# Patient Record
Sex: FEMALE | Race: WHITE | Employment: FULL TIME | URBAN - METROPOLITAN AREA
[De-identification: names, ages, dates, MRNs, and addresses within clinical notes are randomized per-mention and may not be internally consistent; named-entity substitution may affect disease eponyms.]

---

## 2018-05-17 ENCOUNTER — HOSPITAL ENCOUNTER (OUTPATIENT)
Age: 63
Discharge: HOME | End: 2018-05-17
Attending: OTOLARYNGOLOGY
Payer: COMMERCIAL

## 2018-05-17 DIAGNOSIS — H90.3 SENSORINEURAL HEARING LOSS, BILATERAL: ICD-10-CM

## 2018-05-17 DIAGNOSIS — H91.90 UNSPECIFIED HEARING LOSS, UNSPECIFIED EAR: ICD-10-CM

## 2018-05-17 DIAGNOSIS — R42 DIZZINESS AND GIDDINESS: ICD-10-CM

## 2018-05-17 LAB — CREAT UR-MCNC: 0.8 MG/DL (ref 0.6–1.3)

## 2018-05-17 PROCEDURE — A9575 INJ GADOTERATE MEGLUMI 0.1ML: HCPCS | Performed by: OTOLARYNGOLOGY

## 2018-05-17 PROCEDURE — 82565 ASSAY OF CREATININE: CPT

## 2018-05-17 PROCEDURE — 74011636320 HC RX REV CODE- 636/320: Performed by: OTOLARYNGOLOGY

## 2018-05-17 PROCEDURE — 70553 MRI BRAIN STEM W/O & W/DYE: CPT

## 2018-05-17 RX ADMIN — GADOTERATE MEGLUMINE 20 ML: 376.9 INJECTION INTRAVENOUS at 17:00

## 2018-05-22 ENCOUNTER — HOSPITAL ENCOUNTER (OUTPATIENT)
Dept: PHYSICAL THERAPY | Age: 63
Discharge: HOME | End: 2018-05-22
Payer: COMMERCIAL

## 2018-05-22 PROCEDURE — 97110 THERAPEUTIC EXERCISES: CPT | Performed by: PHYSICAL THERAPIST

## 2018-05-22 PROCEDURE — 97162 PT EVAL MOD COMPLEX 30 MIN: CPT | Performed by: PHYSICAL THERAPIST

## 2018-05-22 NOTE — PROGRESS NOTES
PT DAILY TREATMENT NOTE     Patient Name: Howard Cote  Date:2018  : 1955  [x]  Patient  Verified  Payor: Ezra Price / Plan: [address] [place] [address] / Product Type: PPO /    In time:403  Out time:736  Total Treatment Time (min): 33  Visit #: 1     Treatment Area: Vertigo [R42]    SUBJECTIVE  Pain Level (0-10 scale): 1/10  Any medication changes, allergies to medications, adverse drug reactions, diagnosis change, or new procedure performed?: [x] No    [] Yes (see summary sheet for update)  Subjective functional status/changes:   [] No changes reported  HPI: Pt c/o increased dizziness that began 2018 when she woke up from a nap. Pt recalls having a \"horrible noise\" in her right ear and vertigo soon followed. Reports she was admitted to the hospital and stayed 3 days due to the dizziness. Reports she had an MRI that showed nothing significant. Reports she had an injection in the right inner ear on 18 w/o improvements noted. Reports she is on meclizine PRN. Dizziness increases w/ quick head movements and when she bends forward then looks up. Dizziness improves w/ closing her eyes and sitting still. Denies previous surgeries or injuries to her neck, back or shoulders. Denies previous episodes of dizziness or vertigo. Reports she has used a SPC on and off since dizziness began and that she is trying to get away from the [facility] at this time. Will hold onto her  if necessary, continues to drive and does not experience increased symptoms w/ driving. Reports she had her eye checked about 2 months ago and a new prescription which she feels is not correct but got sick and was unable to return to the eye doctor to date.      OBJECTIVE    Modality rationale:  to improve the patients ability to    Min Type Additional Details    [] Estim:  []Unatt       []IFC  []Premod                        []Other:  []w/ice   []w/heat  Position:  Location:    [] Estim: []Att    []TENS instruct  []NMES []Other:  []w/US   []w/ice   []w/heat  Position:  Location:    []  Traction: [] Cervical       []Lumbar                       [] Prone          []Supine                       []Intermittent   []Continuous Lbs:  [] before manual  [] after manual    []  Ultrasound: []Continuous   [] Pulsed                           []1MHz   []3MHz W/cm2:  Location:    []  Iontophoresis with dexamethasone         Location: [] Take home patch   [] In clinic    []  Ice     []  heat  []  Ice massage  []  Laser   []  Anodyne Position:  Location:    []  Laser with stim  []  Other:  Position:  Location:    []  Vasopneumatic Device Pressure:       [] lo [] med [] hi   Temperature: [] lo [] med [] hi   [] Skin assessment post-treatment:  []intact []redness- no adverse reaction    []redness  adverse reaction:     18 min [x]Eval                  []Re-Eval       15 min Therapeutic Exercise:  [] See flow sheet : instructed in and demo'd HEP, educated in mechanics of the inner ear and vestibular system   Rationale: improve coordination, improve balance and increase proprioception to improve the patients ability to improve activity tolerance and mobility      min Therapeutic Activity:  []  See flow sheet :   Rationale:   to improve the patients ability to       min Neuromuscular Re-education:  []  See flow sheet :   Rationale:   to improve the patients ability to      min Manual Therapy:     Rationale:  to      min Gait Training:  ___ feet with ___ device on level surfaces with ___ level of assist   Rationale: With   [] TE   [] TA   [] neuro   [] other: Patient Education: [x] Review HEP    [] Progressed/Changed HEP based on:   [] positioning   [] body mechanics   [] transfers   [] heat/ice application    [] other:      Other Objective/Functional Measures: Pt presents w/ staggering gait, no AD, and reaching for the walls to the left when she walks past them. Smooth pursuits w/ occasional nystagmus.  Occular AROM WNL w/o increased symptoms. VOR x1 (+) horizontally and vertically w/ increased symptoms after just 5 reps. (-) saccades horizontally and vertically. Mod sway w/ rhomberg EO and EC pt only comfortable holding position for 8 seconds, increased dizziness reported. MSR B Mod sway w/ 1 LOB requiring min A for therapist to correct. (-) DHP B. Pain Level (0-10 scale) post treatment: 1/10    ASSESSMENT/Changes in Function: Focus on adaptation ex's and balance therapy to decrease fear of falls and improve tolerance to vestibular hypofunction. Patient will continue to benefit from skilled PT services to modify and progress therapeutic interventions, address functional mobility deficits, analyze and cue movement patterns, analyze and modify body mechanics/ergonomics, address imbalance/dizziness and instruct in home and community integration to attain remaining goals. [x]  See Plan of Care  []  See progress note/recertification  []  See Discharge Summary         Progress towards goals / Updated goals:  Short Term Goals: To be accomplished in 1 weeks:  1. Pt will be independent and complaint w/ HEP to progress gains in PT. At eval: initiated HEP  Long Term Goals: To be accomplished in 8 weeks:  1. Pt will improve FOTO to > or = to 77 to demo improved function. At eval: FOTO = 54  2. Pt will increase balance as demo'd by MSR EC for 20 seconds w/o LOB or HHA for decreased fall risk. At eval: MSR B Mod sway w/ 1 LOB requiring min A for therapist to correct  3. Pt will improve gaze stability as demo'd by no increased symptoms w/ VOR x1 horizontally and vertically for improved tolerance to looking up. At eval: VOR x1 (+) horizontally and vertically w/ increased symptoms after just 5 reps  4. Pt will report > or = to 75%improvement in symptoms for ease w/ return to unrestricted ADLs.    At eval: 0%    PLAN  []  Upgrade activities as tolerated     []  Continue plan of care  []  Update interventions per flow sheet       [] Discharge due to:_  [x]  Other: 2x/8 weeks      Ema Frias, PT 5/22/2018  4:49 PM    Future Appointments  Date Time Provider Kevin Woodward   5/23/2018 10:00 AM Claudia Blotter, PTA [facility]   5/30/2018 10:30 AM [facility] PT [place] [facility]   6/6/2018 12:00 PM [facility]   6/8/2018 8:30 AM [facility] PT [place] 1 [facility]   6/12/2018 9:00 AM Claudia Blotter, PTA [facility]   6/14/2018 9:00 AM Claudia Blotter, PTA [facility]   6/18/2018 9:00 AM Claudia Blotter, PTA [facility]   6/20/2018 11:00 AM Claudia Blotter, PTA [facility]   6/25/2018 9:00 AM Claudia Blotter, PTA [facility]   6/27/2018 9:30 AM Claudia Blotter, PTA [facility]   7/2/2018 9:30 AM Claudia Blotter, PTA [facility]   7/6/2018 9:30 AM Ema Frias, PT [facility]   7/9/2018 9:30 AM Claudia Blotter, PTA [facility]   7/12/2018 9:30 AM Ema Frias, PT [facility]   7/16/2018 9:30 AM Claudia Blotter, PTA [facility]   7/18/2018 9:30 AM Claudia Honeycutt, PTA [facility]

## 2018-05-22 NOTE — PROGRESS NOTES
[facility]              [address]        [place], [address]   [phone] fax    Plan of Care/ Statement of Necessity for Physical Therapy Services  Patient name: Fan Lee Start of Care: 2018   Referral source: Nishant Light MD : 1955    Medical Diagnosis: Vertigo [R42]   Onset Date:18    Treatment Diagnosis: vestibular hypofunction   Prior Hospitalization: see medical history Provider#: [id number]   Medications: Verified on Patient summary List    Comorbidities: allergies, BMI > 30, depression, hearing impairment, HBP, prior surgery   Prior Level of Function: (I) w/ ADLs, no dizziness     The Plan of Care and following information is based on the information from the initial evaluation. Assessment/ key information: Pt is a 69 y/o female w/ c/o increased dizziness that began 2018 when she woke up from a nap. Pt recalls having a \"horrible noise\" in her right ear and vertigo soon followed. Reports she was admitted to the hospital and stayed 3 days due to the dizziness. Reports she had an MRI that showed nothing significant. Reports she had an injection in the right inner ear on 18 w/o improvements noted. Reports she is on meclizine PRN. Dizziness increases w/ quick head movements and when she bends forward then looks up. Dizziness improves w/ closing her eyes and sitting still. Denies previous surgeries or injuries to her neck, back or shoulders. Denies previous episodes of dizziness or vertigo. Reports she has used a SPC on and off since dizziness began and that she is trying to get away from the [facility] at this time. Will hold onto her  if necessary, continues to drive and does not experience increased symptoms w/ driving. Reports she had her eye checked about 2 months ago and a new prescription which she feels is not correct but got sick and was unable to return to the eye doctor to date.  Pt presents w/ staggering gait, no AD, and reaching for the walls to the left when she walks past them. Smooth pursuits w/ occasional nystagmus. Occular AROM WNL w/o increased symptoms. VOR x1 (+) horizontally and vertically w/ increased symptoms after just 5 reps. (-) saccades horizontally and vertically. Mod sway w/ rhomberg EO and EC pt only comfortable holding position for 8 seconds, increased dizziness reported. MSR B Mod sway w/ 1 LOB requiring min A for therapist to correct. (-) DHP B. Pt will benefit from skilled PT to address the deficits and progress with pt goals. Evaluation Complexity History MEDIUM  Complexity : 1-2 comorbidities / personal factors will impact the outcome/ POC ; Examination HIGH Complexity : 4+ Standardized tests and measures addressing body structure, function, activity limitation and / or participation in recreation  ;Presentation MEDIUM Complexity : Evolving with changing characteristics  ; Clinical Decision Making MEDIUM Complexity : FOTO score of 26-74  Overall Complexity Rating: MEDIUM  Problem List: impaired gait/ balance, decrease ADL/ functional abilitiies, decrease activity tolerance and decrease transfer abilities   Treatment Plan may include any combination of the following: Therapeutic exercise, Therapeutic activities, Neuromuscular re-education, Physical agent/modality, Gait/balance training, Manual therapy, Patient education, Functional mobility training and Stair training  Patient / Family readiness to learn indicated by: asking questions, trying to perform skills and interest  Persons(s) to be included in education: patient (P) and family support person (FSP);list   Barriers to Learning/Limitations: None  Patient Goal (s): Rudolph Palm my balance back  Patient Self Reported Health Status: good  Rehabilitation Potential: good    Short Term Goals: To be accomplished in 1 weeks:  1. Pt will be independent and complaint w/ HEP to progress gains in PT. Long Term Goals:  To be accomplished in 8 weeks:  1. Pt will improve FOTO to > or = to 77 to demo improved function. 2. Pt will increase balance as demo'd by MSR EC for 20 seconds w/o LOB or HHA for decreased fall risk. 3. Pt will improve gaze stability as demo'd by no increased symptoms w/ VOR x1 horizontally and vertically for improved tolerance to looking up. 4. Pt will report > or = to 75%improvement in symptoms for ease w/ return to unrestricted ADLs. Frequency / Duration: Patient to be seen 2 times per week for 8 weeks. Patient/ Caregiver education and instruction: Diagnosis, prognosis, activity modification and exercises   [x]  Plan of care has been reviewed with SANJEEV Olivera, PT 5/22/2018 5:01 PM  ________________________________________________________________________    I certify that the above Therapy Services are being furnished while the patient is under my care. I agree with the treatment plan and certify that this therapy is necessary.     [de-identified] Signature:____________________  Date:____________Time: _________    Please sign and return to [facility] [address], [address]   [phone] fax

## 2018-05-23 ENCOUNTER — HOSPITAL ENCOUNTER (OUTPATIENT)
Dept: PHYSICAL THERAPY | Age: 63
Discharge: HOME | End: 2018-05-23
Payer: COMMERCIAL

## 2018-05-23 PROCEDURE — 97112 NEUROMUSCULAR REEDUCATION: CPT

## 2018-05-23 NOTE — PROGRESS NOTES
PT DAILY TREATMENT NOTE     Patient Name: Antony Siddiqi  Date:2018  : 1955  [x]  Patient  Verified  Payor: Vale Vincent / Plan: [address] [place] [address] / Product Type: PPO /    In time:10:00 Out time:10:25  Total Treatment Time (min): 25  Visit #: 2 of 16    Treatment Area: Vertigo [R42]    SUBJECTIVE  Pain Level (0-10 scale): 1  Any medication changes, allergies to medications, adverse drug reactions, diagnosis change, or new procedure performed?: [x] No    [] Yes (see summary sheet for update)  Subjective functional status/changes:   [] No changes reported  Pt reports that she has not starting doing her home exercises. OBJECTIVE    Modality rationale:    Min Type Additional Details    [] Estim:  []Unatt       []IFC  []Premod                        []Other:  []w/ice   []w/heat  Position:  Location:    [] Estim: []Att    []TENS instruct  []NMES                    []Other:  []w/US   []w/ice   []w/heat  Position:  Location:    []  Traction: [] Cervical       []Lumbar                       [] Prone          []Supine                       []Intermittent   []Continuous Lbs:  [] before manual  [] after manual    []  Ultrasound: []Continuous   [] Pulsed                           []1MHz   []3MHz W/cm2:  Location:    []  Iontophoresis with dexamethasone         Location: [] Take home patch   [] In clinic    []  Ice     []  heat  []  Ice massage  []  Laser   []  Anodyne Position:  Location:    []  Laser with stim  []  Other:  Position:  Location:    []  Vasopneumatic Device Pressure:       [] lo [] med [] hi   Temperature: [] lo [] med [] hi   [] Skin assessment post-treatment:  []intact []redness- no adverse reaction    []redness  adverse reaction:          25 min Neuromuscular Re-education:  [x]  See flow sheet :vertigo exercises and dynamic gait   Rationale: improve coordination, improve balance and increase proprioception  to improve the patients ability to increase ease with ADLs. With   [] TE   [] TA   [] neuro   [] other: Patient Education: [x] Review HEP    [] Progressed/Changed HEP based on:   [] positioning   [] body mechanics   [] transfers   [] heat/ice application    [] other:      Other Objective/Functional Measures: pt reports not performing HEP. Pain Level (0-10 scale) post treatment: 3    ASSESSMENT/Changes in Function: During dynamic gait pt was very off balance leaning to the right. Pt is very easily aggravated with increasing dizziness. Patient will continue to benefit from skilled PT services to modify and progress therapeutic interventions, address functional mobility deficits and address imbalance/dizziness to attain remaining goals. []  See Plan of Care  []  See progress note/recertification  []  See Discharge Summary         Progress towards goals / Updated goals:  Short Term Goals: To be accomplished in 1 weeks:  1. Pt will be independent and complaint w/ HEP to progress gains in PT. At eval: initiated HEP  Current; Not met: pt reports not performing HEP. 5/23/18  Long Term Goals: To be accomplished in 8 weeks:  1. Pt will improve FOTO to > or = to 77 to demo improved function. At eval: FOTO = 54  2. Pt will increase balance as demo'd by MSR EC for 20 seconds w/o LOB or HHA for decreased fall risk. At eval: MSR B Mod sway w/ 1 LOB requiring min A for therapist to correct  3. Pt will improve gaze stability as demo'd by no increased symptoms w/ VOR x1 horizontally and vertically for improved tolerance to looking up. At eval: VOR x1 (+) horizontally and vertically w/ increased symptoms after just 5 reps  4. Pt will report > or = to 75%improvement in symptoms for ease w/ return to unrestricted ADLs.    At eval: 0%    PLAN  []  Upgrade activities as tolerated     [x]  Continue plan of care  []  Update interventions per flow sheet       []  Discharge due to:_  []  Other:_      Gaurav Mckeon, SANJEEV 5/23/2018  10:08 AM    Future Appointments  Date Time Provider Kevin Woodward   5/30/2018 10:30 [facility] [place] [facility]   6/6/2018 12:00 PM [facility] [place] [facility]   6/8/2018 8:30 AM [facility] [place] [facility]   6/12/2018 9:00 AM Liban Alexis PTA [facility]   6/15/2018 9:00 AM Liban Alexis PTA [facility]   6/19/2018 9:00 AM Liban Alexis PTA [facility]   6/22/2018 9:30 AM Liban Alexis PTA [facility]   6/26/2018 9:30 AM Liban Alexis PTA [facility]   6/29/2018 10:00 AM Pam Ma, PT [facility]   7/3/2018 11:00 AM Liban Alexis PTA [facility]   7/6/2018 9:30 AM Pam Ma, PT [facility]   7/10/2018 9:30 AM Liban Alexis PTA [facility]   7/13/2018 9:30 AM Liban Alexis PTA [facility]   7/17/2018 9:30 AM Liban Alexis PTA [facility]   7/20/2018 9:30 AM Liban Alexis PTA [facility]

## 2018-05-30 ENCOUNTER — HOSPITAL ENCOUNTER (OUTPATIENT)
Dept: PHYSICAL THERAPY | Age: 63
Discharge: HOME | End: 2018-05-30
Payer: COMMERCIAL

## 2018-05-30 PROCEDURE — 97112 NEUROMUSCULAR REEDUCATION: CPT

## 2018-05-30 NOTE — PROGRESS NOTES
PT DAILY TREATMENT NOTE     Patient Name: Maryann Mcrae  Date:2018  : 1955  [x]  Patient  Verified  Payor: JULIETH DAVID / Plan: [address] [place] [address] / Product Type: PPO /    In time:10:18  Out time:10:46  Total Treatment Time (min): 28  Visit #: 3 of 16    Treatment Area: Vertigo [R42]    SUBJECTIVE  Pain Level (0-10 scale): 2  Any medication changes, allergies to medications, adverse drug reactions, diagnosis change, or new procedure performed?: [x] No    [] Yes (see summary sheet for update)  Subjective functional status/changes:   [] No changes reported  Pt reports she did not have a bad response after last therapy session. OBJECTIVE    Min Type Additional Details    [] Estim:  []Unatt       []IFC  []Premod                        []Other:  []w/ice   []w/heat  Position:  Location:    [] Estim: []Att    []TENS instruct  []NMES                    []Other:  []w/US   []w/ice   []w/heat  Position:  Location:    []  Traction: [] Cervical       []Lumbar                       [] Prone          []Supine                       []Intermittent   []Continuous Lbs:  [] before manual  [] after manual    []  Ultrasound: []Continuous   [] Pulsed                           []1MHz   []3MHz W/cm2:  Location:    []  Iontophoresis with dexamethasone         Location: [] Take home patch   [] In clinic    []  Ice     []  heat  []  Ice massage  []  Laser   []  Anodyne Position:  Location:    []  Laser with stim  []  Other:  Position:  Location:    []  Vasopneumatic Device Pressure:       [] lo [] med [] hi   Temperature: [] lo [] med [] hi   [] Skin assessment post-treatment:  []intact []redness- no adverse reaction    []redness  adverse reaction:       28 min Neuromuscular Re-education:  [x]  See flow sheet :vertigo exercises and dynamic gait   Rationale: improve coordination, improve balance and increase proprioception  to improve the patients ability to increase ease with ADLs.                With   [] TE [] TA   [] neuro   [] other: Patient Education: [x] Review HEP    [] Progressed/Changed HEP based on:   [] positioning   [] body mechanics   [] transfers   [] heat/ice application    [] other:      Other Objective/Functional Measures: Pt reports performing HEP. Pain Level (0-10 scale) post treatment: 3    ASSESSMENT/Changes in Function: Pt has more sway and LOB with dynamic gait with head nods. Patient will continue to benefit from skilled PT services to modify and progress therapeutic interventions, address functional mobility deficits and address imbalance/dizziness to attain remaining goals. []  See Plan of Care  []  See progress note/recertification  []  See Discharge Summary         Progress towards goals / Updated goals:  Short Term Goals: To be accomplished in 1 weeks:  1. Pt will be independent and complaint w/ HEP to progress gains in PT. At eval: initiated HEP  Current; Goal met: Pt reports performing HEP. 5/30/18  Long Term Goals: To be accomplished in 8 weeks:  1. Pt will improve FOTO to > or = to 77 to demo improved function. At eval: FOTO = 54  2. Pt will increase balance as demo'd by MSR EC for 20 seconds w/o LOB or HHA for decreased fall risk. At eval: MSR B Mod sway w/ 1 LOB requiring min A for therapist to correct  3. Pt will improve gaze stability as demo'd by no increased symptoms w/ VOR x1 horizontally and vertically for improved tolerance to looking up. At eval: VOR x1 (+) horizontally and vertically w/ increased symptoms after just 5 reps  4. Pt will report > or = to 75%improvement in symptoms for ease w/ return to unrestricted ADLs.    At eval: 0%     PLAN    PLAN  []  Upgrade activities as tolerated     [x]  Continue plan of care  []  Update interventions per flow sheet       []  Discharge due to:_  []  Other:_      Marylene Plain, PTA 5/30/2018  10:26 AM    Future Appointments  Date Time Provider Kevin Woodward   5/30/2018 10:30 AM Marylene Plain, PTA [facility] 6/6/2018 12:00 PM Mamie Sell, PTA [facility]   6/8/2018 8:30 AM Mamie Sell, PTA [facility]   6/12/2018 9:00 AM Mamie Sell, PTA [facility]   6/15/2018 9:00 AM Mamie Sell, PTA [facility]   6/19/2018 9:00 AM Mamie Sell, PTA [facility]   6/22/2018 9:30 AM Mamie Sell, PTA [facility]   6/26/2018 9:30 AM Mamie Sell, PTA [facility]   6/29/2018 10:00 AM Linda Dominguez, PT [facility]   7/3/2018 11:00 AM Mamie Sell, PTA [facility]   7/6/2018 9:30 AM Linda Dominguez, PT [facility]   7/10/2018 9:30 AM Mamie Sell, PTA [facility]   7/13/2018 9:30 AM Mamie Sell, PTA [facility]   7/17/2018 9:30 AM Mamie Sell, PTA [facility]   7/20/2018 9:30 AM Mamie Sell, PTA [facility]

## 2018-06-06 ENCOUNTER — HOSPITAL ENCOUNTER (OUTPATIENT)
Dept: PHYSICAL THERAPY | Age: 63
Discharge: HOME | End: 2018-06-06
Payer: COMMERCIAL

## 2018-06-06 PROCEDURE — 97112 NEUROMUSCULAR REEDUCATION: CPT

## 2018-06-06 NOTE — PROGRESS NOTES
PT DAILY TREATMENT NOTE     Patient Name: Dannie Reyez  Date:2018  : 1955  [x]  Patient  Verified  Payor: JULIETH ADVID / Plan: [address] [place] [address] / Product Type: PPO /    In time:12:00  Out time:12:29  Total Treatment Time (min): 29  Visit #: 4 of 16    Treatment Area: Vertigo [R42]    SUBJECTIVE  Pain Level (0-10 scale): 2  Any medication changes, allergies to medications, adverse drug reactions, diagnosis change, or new procedure performed?: [x] No    [] Yes (see summary sheet for update)  Subjective functional status/changes:   [] No changes reported  Pt reports she feels she is getting a little better each time. OBJECTIVE        Min Type Additional Details    [] Estim:  []Unatt       []IFC  []Premod                        []Other:  []w/ice   []w/heat  Position:  Location:    [] Estim: []Att    []TENS instruct  []NMES                    []Other:  []w/US   []w/ice   []w/heat  Position:  Location:    []  Traction: [] Cervical       []Lumbar                       [] Prone          []Supine                       []Intermittent   []Continuous Lbs:  [] before manual  [] after manual    []  Ultrasound: []Continuous   [] Pulsed                           []1MHz   []3MHz W/cm2:  Location:    []  Iontophoresis with dexamethasone         Location: [] Take home patch   [] In clinic    []  Ice     []  heat  []  Ice massage  []  Laser   []  Anodyne Position:  Location:    []  Laser with stim  []  Other:  Position:  Location:    []  Vasopneumatic Device Pressure:       [] lo [] med [] hi   Temperature: [] lo [] med [] hi   [] Skin assessment post-treatment:  []intact []redness- no adverse reaction    []redness  adverse reaction:       29 min Neuromuscular Re-education:  [x]  See flow sheet :vertigo exercises and dynamic gait   Rationale: improve coordination, improve balance and increase proprioception  to improve the patients ability to increase ease with ADLs.                  With   [] TE   [] TA   [] neuro   [] other: Patient Education: [x] Review HEP    [] Progressed/Changed HEP based on:   [] positioning   [] body mechanics   [] transfers   [] heat/ice application    [] other:      Other Objective/Functional Measures:  Pt continues to have increase in symptoms with VOR 1 10 reps vertical and horizontal.     Pain Level (0-10 scale) post treatment: 3    ASSESSMENT/Changes in Function: Pt has increase in dizziness with vertical movement when compared to horizontal movement. Patient will continue to benefit from skilled PT services to modify and progress therapeutic interventions, address functional mobility deficits and address imbalance/dizziness to attain remaining goals. []  See Plan of Care  []  See progress note/recertification  []  See Discharge Summary         Progress towards goals / Updated goals:  Short Term Goals: To be accomplished in 1 weeks:  1. Pt will be independent and complaint w/ HEP to progress gains in PT. At eval: initiated HEP  Current; Goal met: Pt reports performing HEP. 5/30/18  Long Term Goals: To be accomplished in 8 weeks:  1. Pt will improve FOTO to > or = to 77 to demo improved function. At eval: FOTO = 54  2. Pt will increase balance as demo'd by MSR EC for 20 seconds w/o LOB or HHA for decreased fall risk. At eval: MSR B Mod sway w/ 1 LOB requiring min A for therapist to correct  3. Pt will improve gaze stability as demo'd by no increased symptoms w/ VOR x1 horizontally and vertically for improved tolerance to looking up. At eval: VOR x1 (+) horizontally and vertically w/ increased symptoms after just 5 reps  Current: Not met: Pt continues to have increase in symptoms with VOR 1 10 reps vertical and horizontal. 6/6/18  4. Pt will report > or = to 75%improvement in symptoms for ease w/ return to unrestricted ADLs.    At eval: 0%        PLAN  []  Upgrade activities as tolerated     [x]  Continue plan of care  []  Update interventions per flow sheet       [] Discharge due to:_  []  Other:_      Rubin Elena, PTA 6/6/2018  12:18 PM    Future Appointments  Date Time Provider Kevin Woodward   6/8/2018 8:30 AM [facility] NELLIE [facility]   6/12/2018 9:00 AM Rubin Elena, PTA [facility]   6/15/2018 9:00 AM Rubin Elena, PTA [facility]   6/19/2018 8:00 AM Rubin Elena, PTA [facility]   6/22/2018 7:30 AM Billie Barrios, PT [facility]   6/25/2018 7:30 AM Rubin Elena, PTA [facility]   6/29/2018 8:30 AM Rubin Elena, PTA [facility]   7/2/2018 8:00 AM Rubin Elena, PTA [facility]   7/6/2018 8:00 AM Rubin Elena, PTA [facility]   7/10/2018 8:00 AM Rubin Elena, PTA [facility]   7/13/2018 8:00 AM Rubin Elena, PTA [facility]   7/17/2018 8:00 AM Rubin Elena, PTA [facility]   7/20/2018 8:00 AM Rubin Elena, PTA [facility]

## 2018-06-08 ENCOUNTER — HOSPITAL ENCOUNTER (OUTPATIENT)
Dept: PHYSICAL THERAPY | Age: 63
Discharge: HOME | End: 2018-06-08
Payer: COMMERCIAL

## 2018-06-08 PROCEDURE — 97116 GAIT TRAINING THERAPY: CPT

## 2018-06-08 PROCEDURE — 97110 THERAPEUTIC EXERCISES: CPT

## 2018-06-08 PROCEDURE — 97112 NEUROMUSCULAR REEDUCATION: CPT

## 2018-06-08 NOTE — PROGRESS NOTES
PT DAILY TREATMENT NOTE - [facility]     Patient Name: Maryann Mcrae  Date:2018  : 1955  [x]  Patient  Verified  Payor: Andrés Kaiser / Plan: [address] [place] [address] / Product Type: PPO /    In time:8:30 AM Out time:9:30 AM  Total Treatment Time (min): 60  Total Timed Codes (min): 60  1:1 Treatment Time ( only): 60   Visit #: 5 of 29    Treatment Area: Vertigo [R42]    SUBJECTIVE  Pain Level (0-10 scale): 1.5  Any medication changes, allergies to medications, adverse drug reactions, diagnosis change, or new procedure performed?: [x] No    [] Yes (see summary sheet for update)  Subjective functional status/changes:   [] No changes reported  Pt states that she had minimal dizziness when she woke up this morning. Notes it started to get a little worse while she was walking around in her home. Notes a decrease in symptoms while sitting down. OBJECTIVE        15 min Therapeutic Exercise:  [x]  See flow sheet :   Rationale: improve coordination  to improve the patients ability to participate in daily activities within the home and community. 20 min Neuromuscular Re-education:  [x]  See flow sheet : Set up, demonstration and participation in romberg standing with EO, EC, and cervical rotations to improve static balance. Set up and demonstration on marches on foam pad in order to increase dynamic balance on unstable surfaces. Rationale: improve balance and increase proprioception  to improve the patients ability to maintain static balance to improve safety and reduce risk for falls when performing standing activities. 25 min Gait Training:  Dynamic gait activities with cervical motion to improve balance with ambulation in order to reduced risk for falls.    Rationale: Increase balance and proprioception to decrease fall risk          With   [] TE   [] TA   [] neuro   [] other: Patient Education: [x] Review HEP    [] Progressed/Changed HEP based on:   [] positioning   [] body mechanics   [] transfers   [] heat/ice application    [] other:      Other Objective/Functional Measures:      Pain Level (0-10 scale) post treatment: 2/10    ASSESSMENT/Changes in Function:   Pt tolerated today's treatment well. Notes increased dizziness during dynamic gait and balance activities. Pt required SBA with occasional MIN A due to LOB during static marches on foam pad and during gait with cervical rotations. Overall pt notes that her dizziness is getting better since starting PT. Patient will continue to benefit from skilled PT services to address imbalance/dizziness to attain remaining goals. [x]  See Plan of Care  []  See progress note/recertification  []  See Discharge Summary         Progress towards goals / Updated goals:    Short Term Goals: To be accomplished in 1 weeks:  1. Pt will be independent and complaint w/ HEP to progress gains in PT. At eval: initiated HEP  Current; Goal met: Pt reports performing HEP. 5/30/18  Long Term Goals: To be accomplished in 8 weeks:  1. Pt will improve FOTO to > or = to 77 to demo improved function. At eval: FOTO = 54  2. Pt will increase balance as demo'd by MSR EC for 20 seconds w/o LOB or HHA for decreased fall risk. At eval: MSR B Mod sway w/ 1 LOB requiring min A for therapist to correct  3. Pt will improve gaze stability as demo'd by no increased symptoms w/ VOR x1 horizontally and vertically for improved tolerance to looking up. At eval: VOR x1 (+) horizontally and vertically w/ increased symptoms after just 5 reps  Current: Not met: Pt continues to have increase in symptoms with VOR 1 10 reps vertical and horizontal. 6/6/18  4. Pt will report > or = to 75%improvement in symptoms for ease w/ return to unrestricted ADLs.    At eval: 0%        PLAN  [x]  Upgrade activities as tolerated     [x]  Continue plan of care  []  Update interventions per flow sheet       []  Discharge due to:_  []  Other:_      Ginger Mtz 6/8/2018  10:28 AM    Future Appointments  Date Time Provider Kevin Woodward   6/12/2018 9:00 AM Dyane Card, PTA [facility]   6/15/2018 9:00 AM Dyane Card, PTA [facility]   6/19/2018 8:00 AM Dyane Card, PTA [facility]   6/22/2018 7:30 AM Oralia Ferris, PT [facility]   6/25/2018 7:30 AM Dyane Card, PTA [facility]   6/29/2018 8:30 AM Dyane Card, PTA [facility]   7/2/2018 8:00 AM Dyane Card, PTA [facility]   7/6/2018 8:00 AM Dyane Card, PTA [facility]   7/10/2018 8:00 AM Dyane Card, PTA [facility]   7/13/2018 8:00 AM Dyane Card, PTA [facility]   7/17/2018 8:00 AM Dyane Card, PTA [facility]   7/20/2018 8:00 AM Dyane Card, PTA [facility]

## 2018-06-12 ENCOUNTER — HOSPITAL ENCOUNTER (OUTPATIENT)
Dept: PHYSICAL THERAPY | Age: 63
Discharge: HOME | End: 2018-06-12
Payer: COMMERCIAL

## 2018-06-12 ENCOUNTER — APPOINTMENT (OUTPATIENT)
Dept: PHYSICAL THERAPY | Age: 63
End: 2018-06-12
Payer: COMMERCIAL

## 2018-06-12 PROCEDURE — 97112 NEUROMUSCULAR REEDUCATION: CPT

## 2018-06-13 ENCOUNTER — APPOINTMENT (OUTPATIENT)
Dept: PHYSICAL THERAPY | Age: 63
End: 2018-06-13
Payer: COMMERCIAL

## 2018-06-14 ENCOUNTER — APPOINTMENT (OUTPATIENT)
Dept: PHYSICAL THERAPY | Age: 63
End: 2018-06-14
Payer: COMMERCIAL

## 2018-06-15 ENCOUNTER — HOSPITAL ENCOUNTER (OUTPATIENT)
Dept: PHYSICAL THERAPY | Age: 63
Discharge: HOME | End: 2018-06-15
Payer: COMMERCIAL

## 2018-06-18 ENCOUNTER — APPOINTMENT (OUTPATIENT)
Dept: PHYSICAL THERAPY | Age: 63
End: 2018-06-18
Payer: COMMERCIAL

## 2018-06-19 ENCOUNTER — HOSPITAL ENCOUNTER (OUTPATIENT)
Dept: PHYSICAL THERAPY | Age: 63
Discharge: HOME | End: 2018-06-19
Payer: COMMERCIAL

## 2018-06-19 PROCEDURE — 97112 NEUROMUSCULAR REEDUCATION: CPT

## 2018-06-19 NOTE — PROGRESS NOTES
PT DAILY TREATMENT NOTE - [facility]     Patient Name: Charity Can  Date:2018  : 1955  [x]  Patient  Verified  Payor: Kassidy Obrien / Plan: [address] [place] [address] / Product Type: PPO /    In time:9:27  Out time:10;00  Total Treatment Time (min): 33  Total Timed Codes (min): 33  1:1 Treatment Time ( only): 33   Visit #: 7 of 16    Treatment Area: Vertigo [R42]    SUBJECTIVE  Pain Level (0-10 scale): 1  Any medication changes, allergies to medications, adverse drug reactions, diagnosis change, or new procedure performed?: [x] No    [] Yes (see summary sheet for update)  Subjective functional status/changes:   [] No changes reported  Pt reports her leg is feeling much better. Pt reports that she felt well over the weekend with not too much dizziness.      OBJECTIVE        Min Type Additional Details    [] Estim:  []Unatt       []IFC  []Premod                        []Other:  []w/ice   []w/heat  Position:  Location:    [] Estim: []Att    []TENS instruct  []NMES                    []Other:  []w/US   []w/ice   []w/heat  Position:  Location:    []  Traction: [] Cervical       []Lumbar                       [] Prone          []Supine                       []Intermittent   []Continuous Lbs:  [] before manual  [] after manual    []  Ultrasound: []Continuous   [] Pulsed                           []1MHz   []3MHz W/cm2:  Location:    []  Iontophoresis with dexamethasone         Location: [] Take home patch   [] In clinic    []  Ice     []  heat  []  Ice massage  []  Laser   []  Anodyne Position:  Location:    []  Laser with stim  []  Other:  Position:  Location:    []  Vasopneumatic Device Pressure:       [] lo [] med [] hi   Temperature: [] lo [] med [] hi   [] Skin assessment post-treatment:  []intact []redness- no adverse reaction    []redness  adverse reaction:         33 min Neuromuscular Re-education:  [x]  See flow sheet :vertigo exercises and dynamic gait   Rationale: improve coordination, improve balance and increase proprioception  to improve the patients ability to increase ease with ADLs. With   [] TE   [] TA   [] neuro   [] other: Patient Education: [x] Review HEP    [] Progressed/Changed HEP based on:   [] positioning   [] body mechanics   [] transfers   [] heat/ice application    [] other:      Other Objective/Functional Measures: Pt continues to have increase in symptoms with VOR 1 10 reps vertical and horizontal     Pain Level (0-10 scale) post treatment: 3    ASSESSMENT/Changes in Function: progressed pt with hurdles during dynamic gait. Pt continues to get easily aggravated with dynamic gait. Patient will continue to benefit from skilled PT services to modify and progress therapeutic interventions, address functional mobility deficits, address ROM deficits, address strength deficits and analyze and address soft tissue restrictions to attain remaining goals. []  See Plan of Care  []  See progress note/recertification  []  See Discharge Summary         Progress towards goals / Updated goals:  Short Term Goals: To be accomplished in 1 weeks:  1. Pt will be independent and complaint w/ HEP to progress gains in PT. At [facility]: initiated HEP  Current; Goal met: Pt reports performing HEP. 5/30/18  Long Term Goals: To be accomplished in 8 weeks:  1. Pt will improve FOTO to > or = to 77 to demo improved function. At [facility]: FOTO = 54  Current: Progressing: FOTO = 58, increased by 4 since [facility]. 6/12/18  2. Pt will increase balance as demo'd by MSR EC for 20 seconds w/o LOB or HHA for decreased fall risk. At [facility]: MSR B Mod sway w/ 1 LOB requiring min A for therapist to correct  3. Pt will improve gaze stability as demo'd by no increased symptoms w/ VOR x1 horizontally and vertically for improved tolerance to looking up.    At [facility]: VOR x1 (+) horizontally and vertically w/ increased symptoms after just 5 reps  Current: Not met: Pt continues to have increase in symptoms with VOR 1 10 reps vertical and horizontal. 6/6/18  4. Pt will report > or = to 75%improvement in symptoms for ease w/ return to unrestricted ADLs.    At eval: 0%           PLAN  []  Upgrade activities as tolerated     [x]  Continue plan of care  []  Update interventions per flow sheet       []  Discharge due to:_  []  Other:_      [place] Ho, PTA 6/19/2018  8:45 AM    Future Appointments  Date Time Provider Kevin Woodward   6/19/2018 9:30 AM [place] Ho, PTA [facility]   6/22/2018 7:30 AM Mak Pal, PT [facility]   6/25/2018 7:30 AM Helen Ho, PTA [facility]   6/29/2018 8:30 AM Helen Ho, PTA [facility]   7/2/2018 8:00 AM [place] Ho, PTA [facility]   7/6/2018 8:00 AM Helen Ho, PTA [facility]   7/10/2018 8:00 AM Helen Ho, PTA [facility]   7/13/2018 8:00 AM [place] Ho, PTA [facility]   7/17/2018 8:00 AM [place] Ho, PTA [facility]   7/20/2018 8:00 AM Helen Ho, PTA [facility]

## 2018-06-20 ENCOUNTER — APPOINTMENT (OUTPATIENT)
Dept: PHYSICAL THERAPY | Age: 63
End: 2018-06-20
Payer: COMMERCIAL

## 2018-06-22 ENCOUNTER — HOSPITAL ENCOUNTER (OUTPATIENT)
Dept: PHYSICAL THERAPY | Age: 63
Discharge: HOME | End: 2018-06-22
Payer: COMMERCIAL

## 2018-06-22 PROCEDURE — 97112 NEUROMUSCULAR REEDUCATION: CPT

## 2018-06-22 NOTE — PROGRESS NOTES
[facility]              [address]        [place], [address]   [phone] fax    Progress Note  Patient name: Yao Estimolinda Start of Care: 2018   Referral source: Mary Jo Apple MD : 1955   Medical/Treatment Diagnosis: Vertigo [R42] Onset Date:2018     Prior Hospitalization: see medical history Provider#: [id number]   Medications: Verified on Patient Summary List    Comorbidities: allergies, BMI > 30, depression, hearing impairment, HBP, prior surgery   Prior Level of Function: (I) w/ ADLs, no dizziness  Visits from Start of Care: 8    Missed Visits: 2    Established Goals:            Short Term Goals: To be accomplished in 1 weeks:  1. Pt will be independent and complaint w/ HEP to progress gains in PT. At eval: initiated HEP  At PN: Goal met: Pt reports performing HEP  Long Term Goals: To be accomplished in 8 weeks:  1. Pt will improve FOTO to > or = to 77 to demo improved function. At eval: FOTO = 54  At PN: Progressing: FOTO = 58, increased by 4 since [facility]  2. Pt will increase balance as demo'd by MSR EC for 20 seconds w/o LOB or HHA for decreased fall risk. At eval: MSR B Mod sway w/ 1 LOB requiring min A for therapist to correct  At PN: Progressing, MSR (left LE forward, EC) = 30 sec, MSR (right LE forward, EC) = 10 sec  3. Pt will improve gaze stability as demo'd by no increased symptoms w/ VOR x1 horizontally and vertically for improved tolerance to looking up. At eval: VOR x1 (+) horizontally and vertically w/ increased symptoms after just 5 reps  At PN: Progressing, Pt continues to have increase in symptoms with VOR x1 10 reps vertical and horizontal  4. Pt will report > or = to 75%improvement in symptoms for ease w/ return to unrestricted ADLs. At eval: 0%  At PN: Progressing, 50% improvement self-perceived since [facility]    Key Functional Changes: See above goals.     Updated Goals: Continue with unmet goals above.    ASSESSMENT/RECOMMENDATIONS:    Since [facility] patient has subjectively reported a 50% improvement in symptoms with patient having objectively demonstrated a significant improvement in static and dynamic balance with emphasis of within clinic sessions placed on improving LE proprioceptive and kinesthetic awareness and improving visual habituation. Patient would benefit from the continuation of PT services to improve gait stability and static and dynamic LE proprioceptive and kinesthetic awareness.     Patient will continue to benefit from skilled PT services to modify and progress therapeutic interventions, address functional mobility deficits, address ROM deficits, address strength deficits, analyze and address soft tissue restrictions, analyze and cue movement patterns and analyze and modify body mechanics/ergonomics to attain remaining goals. [x]Continue therapy per initial plan/protocol at a frequency of  2 x per week for 6 weeks  []Continue therapy with the following recommended changes:_____________________      _____________________________________________________________________  []Discontinue therapy progressing towards or have reached established goals  []Discontinue therapy due to lack of appreciable progress towards goals  []Discontinue therapy due to lack of attendance or compliance  []Await Physician's recommendations/decisions regarding therapy  []Other:________________________________________________________________    Thank you for this referral.    Giselle Chen, PT 6/22/2018 6:52 AM  NOTE TO PHYSICIAN:  PLEASE COMPLETE THE ORDERS BELOW AND   FAX TO [facility]: [phone]  If you are unable to process this request in 24 hours please contact our office: [phone]    []  I have read the above report and request that my patient continue as recommended.   []  I have read the above report and request that my patient continue therapy with the following changes/special instructions:________________________________________  []I have read the above report and request that my patient be discharged from therapy.     Physicians signature: ________________________________Date: _____Time:_____

## 2018-06-22 NOTE — PROGRESS NOTES
PT DAILY TREATMENT NOTE - [facility]     Patient Name: Mora Melton  Date:2018  : 1955  [x]  Patient  Verified  Payor: [employer] / Plan: [address] [place] [address] / Product Type: PPO /    In time:715  Out time:749  Total Treatment Time (min): 34  Total Timed Codes (min): 34  1:1 Treatment Time ( only): 34   Visit #: 8 of 16    Treatment Area: Vertigo [R42]    SUBJECTIVE  Pain Level (0-10 scale): 1  Any medication changes, allergies to medications, adverse drug reactions, diagnosis change, or new procedure performed?: [x] No    [] Yes (see summary sheet for update)  Subjective functional status/changes:   [] No changes reported  Patient reports overall significant improvement since SoC    OBJECTIVE     34 min Neuromuscular Re-education:  [x]  See flow sheet : Emphasis placed on improving visual habituation and improving LE proprioceptive and kinesthetic awareness. Rationale: increase ROM, increase strength, improve balance and increase proprioception  to improve the patients ability to improve safety with community ambulation          With   [] TE   [] TA   [] neuro   [] other: Patient Education: [x] Review HEP    [] Progressed/Changed HEP based on:   [] positioning   [] body mechanics   [] transfers   [] heat/ice application    [] other:      Pain Level (0-10 scale) post treatment: 2    ASSESSMENT/Changes in Function: Since SoC patient has subjectively reported a 50% improvement in symptoms with patient having objectively demonstrated a significant improvement in static and dynamic balance with emphasis of within clinic sessions placed on improving LE proprioceptive and kinesthetic awareness and improving visual habituation. Patient would benefit from the continuation of PT services to improve gait stability and static and dynamic LE proprioceptive and kinesthetic awareness.     Patient will continue to benefit from skilled PT services to modify and progress therapeutic interventions, address functional mobility deficits, address ROM deficits, address strength deficits, analyze and address soft tissue restrictions, analyze and cue movement patterns and analyze and modify body mechanics/ergonomics to attain remaining goals. []  See Plan of Care  [x]  See progress note/recertification  []  See Discharge Summary         Progress towards goals / Updated goals:    Short Term Goals: To be accomplished in 1 weeks:  1. Pt will be independent and complaint w/ HEP to progress gains in PT. At [facility]: initiated HEP  Current; Goal met: Pt reports performing HEP. 5/30/18  Long Term Goals: To be accomplished in 8 weeks:  1. Pt will improve FOTO to > or = to 77 to demo improved function. At [facility]: FOTO = 54  Current: Progressing: FOTO = 58, increased by 4 since [facility]. 6/12/18  2. Pt will increase balance as demo'd by MSR EC for 20 seconds w/o LOB or HHA for decreased fall risk. At [facility]: MSR B Mod sway w/ 1 LOB requiring min A for therapist to correct  Current: Progressing, MSR (left LE forward, EC) = 30 sec, MSR (right LE forward, EC) = 10 sec, 6/22/2018  3. Pt will improve gaze stability as demo'd by no increased symptoms w/ VOR x1 horizontally and vertically for improved tolerance to looking up. At [facility]: VOR x1 (+) horizontally and vertically w/ increased symptoms after just 5 reps  Current: Progressing, Pt continues to have increase in symptoms with VOR x1 10 reps vertical and horizontal. 6/6/18  4. Pt will report > or = to 75%improvement in symptoms for ease w/ return to unrestricted ADLs.    At [facility]: 0%  Current: Progressing, 50% improvement self-perceived since SoC, 6/22/2018        PLAN  [x]  Upgrade activities as tolerated     [x]  Continue plan of care  []  Update interventions per flow sheet       []  Discharge due to:_  []  Other:_      Zeina Preciado PT 6/22/2018  6:51 AM    Future Appointments  Date Time Provider Kevin Woodward   6/22/2018 7:30 AM Zeina Preciado PT [facility]   6/25/2018 7:30 AM Dyane Card, PTA [facility]   6/29/2018 8:30 AM Dyane Card, PTA [facility]   7/2/2018 8:00 AM Dyane Card, PTA [facility]   7/6/2018 8:00 AM Dyane Card, PTA [facility]   7/10/2018 8:00 AM Dyane Card, PTA [facility]   7/13/2018 8:00 AM Dyane Card, PTA [facility]   7/17/2018 8:00 AM Dyane Card, PTA [facility]   7/20/2018 8:00 AM Dyane Card, PTA [facility]

## 2018-06-25 ENCOUNTER — APPOINTMENT (OUTPATIENT)
Dept: PHYSICAL THERAPY | Age: 63
End: 2018-06-25
Payer: COMMERCIAL

## 2018-06-25 ENCOUNTER — HOSPITAL ENCOUNTER (OUTPATIENT)
Dept: PHYSICAL THERAPY | Age: 63
Discharge: HOME | End: 2018-06-25
Payer: COMMERCIAL

## 2018-06-25 PROCEDURE — 97112 NEUROMUSCULAR REEDUCATION: CPT

## 2018-06-25 NOTE — PROGRESS NOTES
PT DAILY TREATMENT NOTE - [facility]     Patient Name: Juanita Burks  Date:2018  : 1955  [x]  Patient  Verified  Payor: Kathie Beto / Plan: [address] [place] [address] / Product Type: PPO /    In time:7:13  Out time:7:45  Total Treatment Time (min): 32  Total Timed Codes (min): 32  1:1 Treatment Time ( only): 32   Visit #: 9 of 13    Treatment Area: Vertigo [R42]    SUBJECTIVE  Pain Level (0-10 scale): 0.5  Any medication changes, allergies to medications, adverse drug reactions, diagnosis change, or new procedure performed?: [x] No    [] Yes (see summary sheet for update)  Subjective functional status/changes:   [] No changes reported  Pt reports that just standing still she does not have dizziness like she did before, only when she moves her head.      OBJECTIVE        Min Type Additional Details    [] Estim:  []Unatt       []IFC  []Premod                        []Other:  []w/ice   []w/heat  Position:  Location:    [] Estim: []Att    []TENS instruct  []NMES                    []Other:  []w/US   []w/ice   []w/heat  Position:  Location:    []  Traction: [] Cervical       []Lumbar                       [] Prone          []Supine                       []Intermittent   []Continuous Lbs:  [] before manual  [] after manual    []  Ultrasound: []Continuous   [] Pulsed                           []1MHz   []3MHz W/cm2:  Location:    []  Iontophoresis with dexamethasone         Location: [] Take home patch   [] In clinic    []  Ice     []  heat  []  Ice massage  []  Laser   []  Anodyne Position:  Location:    []  Laser with stim  []  Other:  Position:  Location:    []  Vasopneumatic Device Pressure:       [] lo [] med [] hi   Temperature: [] lo [] med [] hi   [] Skin assessment post-treatment:  []intact []redness- no adverse reaction    []redness  adverse reaction:          32 min Neuromuscular Re-education:  [x]  See flow sheet :vertigo exercises and dynamic gait   Rationale: improve coordination, improve balance and increase proprioception  to improve the patients ability to increase ease with ADLs. With   [] TE   [] TA   [] neuro   [] other: Patient Education: [x] Review HEP    [] Progressed/Changed HEP based on:   [] positioning   [] body mechanics   [] transfers   [] heat/ice application    [] other:      Other Objective/Functional Measures:      Pain Level (0-10 scale) post treatment: 2    ASSESSMENT/Changes in Function: Pt is continued to be challenged with ambulation with head turns and nods. Patient will continue to benefit from skilled PT services to modify and progress therapeutic interventions, address functional mobility deficits and address imbalance/dizziness to attain remaining goals. []  See Plan of Care  []  See progress note/recertification  []  See Discharge Summary         Progress towards goals / Updated goals:  Short Term Goals: To be accomplished in 1 weeks:  1. Pt will be independent and complaint w/ HEP to progress gains in PT. At PN: Goal met: Pt reports performing HEP. 5/30/18  Long Term Goals: To be accomplished in 8 weeks:  1. Pt will improve FOTO to > or = to 77 to demo improved function. At PN:  FOTO = 58, increased by 4 since [facility]. 6/12/18  2. Pt will increase balance as demo'd by MSR EC for 20 seconds w/o LOB or HHA for decreased fall risk. At PN:  MSR (left LE forward, EC) = 30 sec, MSR (right LE forward, EC) = 10 sec, 6/22/2018  3. Pt will improve gaze stability as demo'd by no increased symptoms w/ VOR x1 horizontally and vertically for improved tolerance to looking up. At PN: Pt continues to have increase in symptoms with VOR x1 10 reps vertical and horizontal. 6/6/18  4. Pt will report > or = to 75%improvement in symptoms for ease w/ return to unrestricted ADLs.    At PN: 50% improvement self-perceived since SoC, 6/22/2018        PLAN  []  Upgrade activities as tolerated     [x]  Continue plan of care  []  Update interventions per flow sheet       [] Discharge due to:_  []  Other:_      [place] Ho, PTA 6/25/2018  7:25 AM    Future Appointments  Date Time Provider Kevin Woodward   6/25/2018 7:30 AM Helen Ho, PTA [facility]   6/29/2018 8:30 AM [place] Ho, PTA [facility]   7/2/2018 8:00 AM Helen Ho, PTA [facility]   7/6/2018 8:00 AM Helen Ho, PTA [facility]   7/10/2018 8:00 AM [place] Ho, PTA [facility]   7/13/2018 8:00 AM [place] Ho, PTA [facility]   7/17/2018 8:00 AM [place] Ho, PTA [facility]   7/20/2018 8:00 AM [place] Ho, PTA [facility]

## 2018-06-26 ENCOUNTER — APPOINTMENT (OUTPATIENT)
Dept: PHYSICAL THERAPY | Age: 63
End: 2018-06-26
Payer: COMMERCIAL

## 2018-06-27 ENCOUNTER — APPOINTMENT (OUTPATIENT)
Dept: PHYSICAL THERAPY | Age: 63
End: 2018-06-27
Payer: COMMERCIAL

## 2018-06-29 ENCOUNTER — HOSPITAL ENCOUNTER (OUTPATIENT)
Dept: PHYSICAL THERAPY | Age: 63
Discharge: HOME | End: 2018-06-29
Payer: COMMERCIAL

## 2018-06-29 PROCEDURE — 97112 NEUROMUSCULAR REEDUCATION: CPT

## 2018-06-29 NOTE — PROGRESS NOTES
PT DAILY TREATMENT NOTE - [facility]     Patient Name: Trinidad Hansen  Date:2018  : 1955  [x]  Patient  Verified  Payor: Delena Boeck / Plan: [address] [place] [address] / Product Type: PPO /    In time:8:21  Out time:8:53  Total Treatment Time (min): 32  Total Timed Codes (min): 32  1:1 Treatment Time (MC only): 32   Visit #: 1 of 12 (signed PN)    Treatment Area: Vertigo [R42]    SUBJECTIVE  Pain Level (0-10 scale): 0.5  Any medication changes, allergies to medications, adverse drug reactions, diagnosis change, or new procedure performed?: [x] No    [] Yes (see summary sheet for update)  Subjective functional status/changes:   [] No changes reported  Pt reports she has been doing well with having very little dizziness.      OBJECTIVE        Min Type Additional Details    [] Estim:  []Unatt       []IFC  []Premod                        []Other:  []w/ice   []w/heat  Position:  Location:    [] Estim: []Att    []TENS instruct  []NMES                    []Other:  []w/US   []w/ice   []w/heat  Position:  Location:    []  Traction: [] Cervical       []Lumbar                       [] Prone          []Supine                       []Intermittent   []Continuous Lbs:  [] before manual  [] after manual    []  Ultrasound: []Continuous   [] Pulsed                           []1MHz   []3MHz W/cm2:  Location:    []  Iontophoresis with dexamethasone         Location: [] Take home patch   [] In clinic    []  Ice     []  heat  []  Ice massage  []  Laser   []  Anodyne Position:  Location:    []  Laser with stim  []  Other:  Position:  Location:    []  Vasopneumatic Device Pressure:       [] lo [] med [] hi   Temperature: [] lo [] med [] hi   [] Skin assessment post-treatment:  []intact []redness- no adverse reaction    []redness  adverse reaction:         32 min Neuromuscular Re-education:  [x]  See flow sheet :vertigo exercises and dynamic gait   Rationale: improve coordination, improve balance and increase proprioception  to improve the patients ability to increase ease with ADLs. With   [] TE   [] TA   [] neuro   [] other: Patient Education: [x] Review HEP    [] Progressed/Changed HEP based on:   [] positioning   [] body mechanics   [] transfers   [] heat/ice application    [] other:      Other Objective/Functional Measures:  B MSR EC 30 sec without HHA     Pain Level (0-10 scale) post treatment:2    ASSESSMENT/Changes in Function: pt was able to perform balance beam walking and cone  with less episodes of LOB. Patient will continue to benefit from skilled PT services to modify and progress therapeutic interventions, address functional mobility deficits, address ROM deficits and address imbalance/dizziness to attain remaining goals. []  See Plan of Care  []  See progress note/recertification  []  See Discharge Summary         Progress towards goals / Updated goals:  Short Term Goals: To be accomplished in 1 weeks:  1. Pt will be independent and complaint w/ HEP to progress gains in PT. At PN: Goal met: Pt reports performing HEP. 5/30/18  Long Term Goals: To be accomplished in 8 weeks:  1. Pt will improve FOTO to > or = to 77 to demo improved function. At PN:  FOTO = 58, increased by 4 since [facility]. 6/12/18  2. Pt will increase balance as demo'd by MSR EC for 20 seconds w/o LOB or HHA for decreased fall risk. At PN:  MSR (left LE forward, EC) = 30 sec, MSR (right LE forward, EC) = 10 sec, 6/22/2018  Current: Goal met: B MSR EC 30 sec without HHA. 6/29/18  3. Pt will improve gaze stability as demo'd by no increased symptoms w/ VOR x1 horizontally and vertically for improved tolerance to looking up. At PN: Pt continues to have increase in symptoms with VOR x1 10 reps vertical and horizontal. 6/6/18  4. Pt will report > or = to 75%improvement in symptoms for ease w/ return to unrestricted ADLs.    At PN: 50% improvement self-perceived since SoC, 6/22/2018        PLAN  []  Upgrade activities as tolerated     [x]  Continue plan of care  []  Update interventions per flow sheet       []  Discharge due to:_  []  Other:_      Yana Spore, PTA 6/29/2018  8:30 AM    Future Appointments  Date Time Provider Kevin Woodward   7/2/2018 8:00 AM Yana Spore, PTA [facility]   7/6/2018 8:00 AM Yana Spore, PTA [facility]   7/10/2018 8:00 AM Yana Spore, PTA [facility]   7/13/2018 8:00 AM Yana Spore, PTA [facility]   7/17/2018 8:00 AM Yana Spore, PTA [facility]   7/20/2018 8:00 AM Yana Spore, PTA [facility]

## 2018-07-02 ENCOUNTER — APPOINTMENT (OUTPATIENT)
Dept: PHYSICAL THERAPY | Age: 63
End: 2018-07-02

## 2018-07-03 ENCOUNTER — APPOINTMENT (OUTPATIENT)
Dept: PHYSICAL THERAPY | Age: 63
End: 2018-07-03

## 2018-07-06 ENCOUNTER — APPOINTMENT (OUTPATIENT)
Dept: PHYSICAL THERAPY | Age: 63
End: 2018-07-06

## 2018-07-09 ENCOUNTER — APPOINTMENT (OUTPATIENT)
Dept: PHYSICAL THERAPY | Age: 63
End: 2018-07-09

## 2018-07-10 ENCOUNTER — APPOINTMENT (OUTPATIENT)
Dept: PHYSICAL THERAPY | Age: 63
End: 2018-07-10

## 2018-07-11 ENCOUNTER — APPOINTMENT (OUTPATIENT)
Dept: PHYSICAL THERAPY | Age: 63
End: 2018-07-11

## 2018-07-12 ENCOUNTER — APPOINTMENT (OUTPATIENT)
Dept: PHYSICAL THERAPY | Age: 63
End: 2018-07-12

## 2018-07-13 ENCOUNTER — APPOINTMENT (OUTPATIENT)
Dept: PHYSICAL THERAPY | Age: 63
End: 2018-07-13

## 2018-07-16 ENCOUNTER — APPOINTMENT (OUTPATIENT)
Dept: PHYSICAL THERAPY | Age: 63
End: 2018-07-16

## 2018-07-17 ENCOUNTER — APPOINTMENT (OUTPATIENT)
Dept: PHYSICAL THERAPY | Age: 63
End: 2018-07-17

## 2018-07-18 ENCOUNTER — APPOINTMENT (OUTPATIENT)
Dept: PHYSICAL THERAPY | Age: 63
End: 2018-07-18

## 2018-07-20 ENCOUNTER — APPOINTMENT (OUTPATIENT)
Dept: PHYSICAL THERAPY | Age: 63
End: 2018-07-20

## 2018-07-23 ENCOUNTER — APPOINTMENT (OUTPATIENT)
Dept: PHYSICAL THERAPY | Age: 63
End: 2018-07-23

## 2018-07-27 ENCOUNTER — APPOINTMENT (OUTPATIENT)
Dept: PHYSICAL THERAPY | Age: 63
End: 2018-07-27

## 2018-08-01 ENCOUNTER — APPOINTMENT (OUTPATIENT)
Dept: PHYSICAL THERAPY | Age: 63
End: 2018-08-01

## 2018-08-01 NOTE — PROGRESS NOTES
[facility] [address] [facility] [place], [address]  
[phone] fax Discharge Summary [facility] Referral source: John Chance MD : 1955 Medical/Treatment Diagnosis: Vertigo [R42] Onset Date:2018 Prior Hospitalization: see medical history Provider#: [id number] Medications: Verified on Patient Summary List   
Comorbidities: allergies, BMI > 30, depression, hearing impairment, HBP, prior surgery 
 Prior Level of Function: (I) w/ ADLs, no dizziness Visits from Start of Care: 10    Missed Visits: 3 Reporting Period : 2018 to 2018 Summary of Care: 
 
Short Term Goals: To be accomplished in 1 weeks: 1. Pt will be independent and complaint w/ HEP to progress gains in PT. At PN: Goal met: Pt reports performing HEP Long Term Goals: To be accomplished in 8 weeks: 1. Pt will improve FOTO to > or = to 77 to demo improved function. At PN:  FOTO = 58, increased by 4 since [facility] 2. Pt will increase balance as demo'd by MSR EC for 20 seconds w/o LOB or HHA for decreased fall risk. At PN:  MSR (left LE forward, EC) = 30 sec, MSR (right LE forward, EC) = 10 sec At DC: Goal met: B MSR EC 30 sec without HHA 3. Pt will improve gaze stability as demo'd by no increased symptoms w/ VOR x1 horizontally and vertically for improved tolerance to looking up. At PN: Pt continues to have increase in symptoms with VOR x1 10 reps vertical and horizontal 
At DC: Inability to assess 4. Pt will report > or = to 75%improvement in symptoms for ease w/ return to unrestricted ADLs. At PN: 50% improvement self-perceived since [facility] At DC: Inability to assess ASSESSMENT/RECOMMENDATIONS: 
 
At this time patient to be discharged in accordance with clinic 30 day policy with patient having last been seen within clinic 2018.  Patient contacted clinic 7/3/2018 with desire to cancel all remaining appointments secondary to receival of bilateral knee injections with patient having demonstrated significant weightbearing limitations secondarily. [x]Discontinue therapy: []Patient has reached or is progressing toward set goals [x]Patient is non-compliant or has abdicated 
    []Due to lack of appreciable progress towards set goals Johnson Gil, PT 8/1/2018 6:43 PM

## 2021-06-01 ENCOUNTER — HOSPITAL ENCOUNTER (OUTPATIENT)
Dept: LAB | Age: 66
Discharge: HOME | End: 2021-06-01
Payer: COMMERCIAL

## 2021-06-01 PROCEDURE — 88305 TISSUE EXAM BY PATHOLOGIST: CPT

## 2022-06-13 ENCOUNTER — TRANSCRIBE ORDER (OUTPATIENT)
Dept: INTERVENTIONAL RADIOLOGY/VASCULAR | Age: 67
End: 2022-06-13

## 2022-06-13 DIAGNOSIS — D30.02 BENIGN NEOPLASM OF LEFT KIDNEY: Primary | ICD-10-CM

## 2022-06-13 NOTE — PROGRESS NOTES
Spoke to pt to schedule appointment. Pt allergy to lidocaine. Pt to be NPO at midnight prior to procedure. Pt to arrive at 0800 for 0930 appointment. Pt denies anticoagulation but takes an occasional Excedrin, RN asked pt not to take this the week of procedure.

## 2022-07-01 ENCOUNTER — HOSPITAL ENCOUNTER (OUTPATIENT)
Dept: INTERVENTIONAL RADIOLOGY/VASCULAR | Age: 67
Discharge: HOME | End: 2022-07-01
Attending: UROLOGY | Admitting: RADIOLOGY
Payer: COMMERCIAL

## 2022-07-01 VITALS
RESPIRATION RATE: 16 BRPM | WEIGHT: 227 LBS | BODY MASS INDEX: 36.48 KG/M2 | OXYGEN SATURATION: 94 % | DIASTOLIC BLOOD PRESSURE: 55 MMHG | SYSTOLIC BLOOD PRESSURE: 162 MMHG | HEART RATE: 65 BPM | TEMPERATURE: 98.2 F | HEIGHT: 66 IN

## 2022-07-01 DIAGNOSIS — D30.02 BENIGN NEOPLASM OF LEFT KIDNEY: ICD-10-CM

## 2022-07-01 LAB
ANION GAP SERPL CALC-SCNC: 5 MMOL/L (ref 3–18)
APTT PPP: 24.7 SEC (ref 23–36.4)
BUN SERPL-MCNC: 16 MG/DL (ref 7–18)
BUN/CREAT SERPL: 24 (ref 12–20)
CALCIUM SERPL-MCNC: 9.2 MG/DL (ref 8.5–10.1)
CHLORIDE SERPL-SCNC: 107 MMOL/L (ref 100–111)
CO2 SERPL-SCNC: 28 MMOL/L (ref 21–32)
CREAT SERPL-MCNC: 0.67 MG/DL (ref 0.6–1.3)
ERYTHROCYTE [DISTWIDTH] IN BLOOD BY AUTOMATED COUNT: 12.1 % (ref 11.6–14.5)
GLUCOSE SERPL-MCNC: 110 MG/DL (ref 74–99)
HCT VFR BLD AUTO: 39.9 % (ref 35–45)
HGB BLD-MCNC: 13.6 G/DL (ref 12–16)
INR PPP: 1 (ref 0.8–1.2)
MCH RBC QN AUTO: 30.8 PG (ref 24–34)
MCHC RBC AUTO-ENTMCNC: 34.1 G/DL (ref 31–37)
MCV RBC AUTO: 90.3 FL (ref 78–100)
NRBC # BLD: 0 K/UL (ref 0–0.01)
NRBC BLD-RTO: 0 PER 100 WBC
PLATELET # BLD AUTO: 287 K/UL (ref 135–420)
PMV BLD AUTO: 10.6 FL (ref 9.2–11.8)
POTASSIUM SERPL-SCNC: 4.1 MMOL/L (ref 3.5–5.5)
PROTHROMBIN TIME: 13.1 SEC (ref 11.5–15.2)
RBC # BLD AUTO: 4.42 M/UL (ref 4.2–5.3)
SODIUM SERPL-SCNC: 140 MMOL/L (ref 136–145)
WBC # BLD AUTO: 7.3 K/UL (ref 4.6–13.2)

## 2022-07-01 PROCEDURE — 74011250636 HC RX REV CODE- 250/636: Performed by: RADIOLOGY

## 2022-07-01 PROCEDURE — 80048 BASIC METABOLIC PNL TOTAL CA: CPT

## 2022-07-01 PROCEDURE — 85730 THROMBOPLASTIN TIME PARTIAL: CPT

## 2022-07-01 PROCEDURE — 99152 MOD SED SAME PHYS/QHP 5/>YRS: CPT

## 2022-07-01 PROCEDURE — 74011000250 HC RX REV CODE- 250

## 2022-07-01 PROCEDURE — 74011250637 HC RX REV CODE- 250/637: Performed by: RADIOLOGY

## 2022-07-01 PROCEDURE — 74011000250 HC RX REV CODE- 250: Performed by: RADIOLOGY

## 2022-07-01 PROCEDURE — 85610 PROTHROMBIN TIME: CPT

## 2022-07-01 PROCEDURE — 36251 INS CATH REN ART 1ST UNILAT: CPT

## 2022-07-01 PROCEDURE — 99153 MOD SED SAME PHYS/QHP EA: CPT

## 2022-07-01 PROCEDURE — 74011000636 HC RX REV CODE- 636: Performed by: RADIOLOGY

## 2022-07-01 PROCEDURE — 85027 COMPLETE CBC AUTOMATED: CPT

## 2022-07-01 RX ORDER — NITROGLYCERIN 10 MG/100ML
INJECTION INTRAVENOUS
Status: COMPLETED
Start: 2022-07-01 | End: 2022-07-01

## 2022-07-01 RX ORDER — BUPIVACAINE HYDROCHLORIDE 2.5 MG/ML
1-10 INJECTION, SOLUTION EPIDURAL; INFILTRATION; INTRACAUDAL
Status: COMPLETED | OUTPATIENT
Start: 2022-07-01 | End: 2022-07-01

## 2022-07-01 RX ORDER — HEPARIN SODIUM 200 [USP'U]/100ML
1000 INJECTION, SOLUTION INTRAVENOUS
Status: COMPLETED | OUTPATIENT
Start: 2022-07-01 | End: 2022-07-01

## 2022-07-01 RX ORDER — MIDAZOLAM HYDROCHLORIDE 1 MG/ML
.5-2 INJECTION, SOLUTION INTRAMUSCULAR; INTRAVENOUS
Status: DISCONTINUED | OUTPATIENT
Start: 2022-07-01 | End: 2022-07-01 | Stop reason: HOSPADM

## 2022-07-01 RX ORDER — CEFAZOLIN SODIUM/WATER 2 G/20 ML
2 SYRINGE (ML) INTRAVENOUS
Status: COMPLETED | OUTPATIENT
Start: 2022-07-01 | End: 2022-07-01

## 2022-07-01 RX ORDER — FAMOTIDINE 20 MG/1
20 TABLET, FILM COATED ORAL 2 TIMES DAILY
COMMUNITY
Start: 2021-05-11

## 2022-07-01 RX ORDER — DICLOFENAC POTASSIUM 50 MG/1
50 TABLET, FILM COATED ORAL 2 TIMES DAILY
COMMUNITY

## 2022-07-01 RX ORDER — ACETAMINOPHEN 325 MG/1
650 TABLET ORAL ONCE
Status: COMPLETED | OUTPATIENT
Start: 2022-07-01 | End: 2022-07-01

## 2022-07-01 RX ORDER — HEPARIN SODIUM 1000 [USP'U]/ML
1000-10000 INJECTION, SOLUTION INTRAVENOUS; SUBCUTANEOUS
Status: DISCONTINUED | OUTPATIENT
Start: 2022-07-01 | End: 2022-07-01 | Stop reason: HOSPADM

## 2022-07-01 RX ORDER — FLUMAZENIL 0.1 MG/ML
0.2 INJECTION INTRAVENOUS AS NEEDED
Status: DISCONTINUED | OUTPATIENT
Start: 2022-07-01 | End: 2022-07-01 | Stop reason: HOSPADM

## 2022-07-01 RX ORDER — FENTANYL CITRATE 50 UG/ML
25-100 INJECTION, SOLUTION INTRAMUSCULAR; INTRAVENOUS
Status: DISCONTINUED | OUTPATIENT
Start: 2022-07-01 | End: 2022-07-01 | Stop reason: HOSPADM

## 2022-07-01 RX ORDER — SODIUM CHLORIDE 9 MG/ML
20 INJECTION, SOLUTION INTRAVENOUS CONTINUOUS
Status: DISCONTINUED | OUTPATIENT
Start: 2022-07-01 | End: 2022-07-01 | Stop reason: HOSPADM

## 2022-07-01 RX ORDER — ACETAMINOPHEN 500 MG
1000 TABLET ORAL DAILY
COMMUNITY

## 2022-07-01 RX ORDER — TELMISARTAN 40 MG/1
40 TABLET ORAL DAILY
COMMUNITY
Start: 2021-01-25

## 2022-07-01 RX ORDER — CETIRIZINE HYDROCHLORIDE 10 MG/1
10 CAPSULE, LIQUID FILLED ORAL DAILY
COMMUNITY

## 2022-07-01 RX ORDER — MECLIZINE HYDROCHLORIDE 25 MG/1
25 TABLET ORAL
COMMUNITY

## 2022-07-01 RX ORDER — SUMATRIPTAN 25 MG/1
25 TABLET, FILM COATED ORAL ONCE
COMMUNITY

## 2022-07-01 RX ORDER — HEPARIN SODIUM 200 [USP'U]/100ML
500 INJECTION, SOLUTION INTRAVENOUS
Status: DISCONTINUED | OUTPATIENT
Start: 2022-07-01 | End: 2022-07-01 | Stop reason: CLARIF

## 2022-07-01 RX ORDER — NALOXONE HYDROCHLORIDE 0.4 MG/ML
0.4 INJECTION, SOLUTION INTRAMUSCULAR; INTRAVENOUS; SUBCUTANEOUS
Status: DISCONTINUED | OUTPATIENT
Start: 2022-07-01 | End: 2022-07-01 | Stop reason: HOSPADM

## 2022-07-01 RX ADMIN — IOPAMIDOL 15 ML: 755 INJECTION, SOLUTION INTRAVENOUS at 11:30

## 2022-07-01 RX ADMIN — IOPAMIDOL 55 ML: 510 INJECTION, SOLUTION INTRAVASCULAR at 11:28

## 2022-07-01 RX ADMIN — ACETAMINOPHEN 650 MG: 325 TABLET ORAL at 11:46

## 2022-07-01 RX ADMIN — SODIUM CHLORIDE 20 ML/HR: 9 INJECTION, SOLUTION INTRAVENOUS at 08:30

## 2022-07-01 RX ADMIN — HEPARIN SODIUM 3000 UNITS: 1000 INJECTION INTRAVENOUS; SUBCUTANEOUS at 10:18

## 2022-07-01 RX ADMIN — MIDAZOLAM HYDROCHLORIDE 0.5 MG: 1 INJECTION, SOLUTION INTRAMUSCULAR; INTRAVENOUS at 10:05

## 2022-07-01 RX ADMIN — HEPARIN SODIUM IN SODIUM CHLORIDE 2000 UNITS: 200 INJECTION INTRAVENOUS at 10:05

## 2022-07-01 RX ADMIN — NITROGLYCERIN 100 MCG/MIN: 10 INJECTION INTRAVENOUS at 11:06

## 2022-07-01 RX ADMIN — CEFAZOLIN 2 G: 10 INJECTION, POWDER, FOR SOLUTION INTRAVENOUS at 09:41

## 2022-07-01 RX ADMIN — BUPIVACAINE HYDROCHLORIDE 5 MG: 2.5 INJECTION, SOLUTION EPIDURAL; INFILTRATION; INTRACAUDAL at 10:05

## 2022-07-01 RX ADMIN — FENTANYL CITRATE 25 MCG: 50 INJECTION, SOLUTION INTRAMUSCULAR; INTRAVENOUS at 10:05

## 2022-07-01 RX ADMIN — HEPARIN SODIUM 3000 UNITS: 1000 INJECTION INTRAVENOUS; SUBCUTANEOUS at 10:48

## 2022-07-01 NOTE — PROGRESS NOTES
TRANSFER - OUT REPORT:    Verbal report given to [facility], RN (name) on Osvaldo Brennan  being transferred to Care Unit (unit) for routine progression of care       Report consisted of patients Situation, Background, Assessment and   Recommendations(SBAR). Information from the following report(s) SBAR, Procedure Summary, MAR and Cardiac Rhythm SR/SB with unifocal PVCs was reviewed with the receiving nurse. Lines:   Peripheral IV 07/01/22 Anterior;Right Hand (Active)   Site Assessment Clean, dry, & intact 07/01/22 0829   Phlebitis Assessment 0 07/01/22 0829   Infiltration Assessment 0 07/01/22 0829   Dressing Status Clean, dry, & intact 07/01/22 0829   Dressing Type Tape;Transparent 07/01/22 0829   Hub Color/Line Status Pink;Flushed 07/01/22 0829   Action Taken Blood drawn;Open ports on tubing capped 07/01/22 0829   Alcohol Cap Used No 07/01/22 0829        Opportunity for questions and clarification was provided.       Patient transported with:   Registered Nurse     Reza Parks RN

## 2022-07-01 NOTE — PROCEDURES
Interventional Radiology Brief Procedure Note    Interventional Radiologist: Temi Cornelius MD    Pre-operative Diagnosis: Left AML    Post-operative Diagnosis: Same as pre-operative diagnosis    Procedure(s) Performed:  Left AML embollization    Anesthesia:  Local and Moderate Sedation    Findings: Left renal angiogram and selective branches arteriograms with embolization of branches to AML. Right CFA access and closure device deployed. Right leg straight 2 hours.      Complications: None    Estimated Blood Loss:  minimal    Tubes and Drains: None    Specimens: None    Condition: Good      Temi Cornelius MD  [facility]  7/1/2022

## 2022-07-01 NOTE — PROGRESS NOTES
Pt. Is all prepped and ready for procedure. 0930 Pt. To IR via stretcher. 1140 Pt. Returned from [employer], awake and alert. Pt. States pain level \"5\" on pain scale 1-10, area above left hip. Dressing to right groin dry and intact. 1145 PO fluids and food given. 1215 Pt. States pain is about gone to left area above hip   1310 Pt. States has mild discomfort to right hip. Pt. Repositioned and pain improved. 1330 Starting to acclamate pt. To sitting position. 1350 Pt. In sitting position. No changes noted. 1400 Pt. Ambulated to bathroom and back to room after voiding. 1415 pt. Dressed and d/c'd in c/o  via w/c to home. Pt. In stable condition. No c/o pain. Dressing to right groin dry and intact.

## 2022-07-01 NOTE — DISCHARGE INSTRUCTIONS
Patient Education        Learning About Tumor Embolization and Ablation  What are tumor embolization and tumor ablation? These are two ways to treat certain types of tumors without using surgery. These treatments may be used alone or together. They may also be used with other treatments. They may be an option when surgery is not possible or is too risky. Tumor embolization treats a tumor by cutting off its blood supply. Without blood, the tumor will shrink or at least grow more slowly. The doctor puts a substance into the blood vessel that supplies or feeds the tumor. Several substances can be used to block blood flow. They may include particles, chemotherapy, or tiny beads. The tiny beads may contain chemotherapy or radiation. Embolization is sometimes done to shrink a tumor before tumor ablation. It may also be done before surgery because it reduces bleeding. This makes the tumor easier to see. Tumor ablation is a way to destroy tumors. It may be done using:  · Heat. Radio waves may be used to burn the tumor. This is called radiofrequency ablation. Other ways to apply heat include using microwaves, lasers, or ultrasound. · Cold. A very cold gas is used to freeze the tumor. This is called cryotherapy or cryoablation. · Chemicals. A chemical is injected into the tumor. This is called chemical ablation. Alcohol (ethanol) is often used. Ablation may be a good option for smaller tumors. It may not work well in larger tumors. How are they done? For both procedures, the doctor uses ultrasound, a CT scan, or other imaging to guide the treatment. Tumor embolization is usually done through an artery. This is called arterial or trans-arterial embolization. A thin tube called a catheter is inserted into a large artery, often one near the groin. Then the doctor moves the catheter into the smaller artery that supplies blood to the tumor.  The substance that will block the blood supply is placed in the artery near the tumor. Then the catheter is removed. Tumor ablation is done using a special needle called a probe. The doctor puts it through the skin and into the tumor. The probe sends heat, cold, or chemicals into the tumor. If the tumor is large, the doctor may repeat the process from a different angle. This is to make sure that all parts of the tumor are treated. After the treatment, the doctor removes the probe. What can you expect after these treatments? You may be able to go home the same day. But in some cases, you might need to stay in the hospital overnight or longer. You will have a bandage over your skin where the probe or catheter was inserted. This area may be sore for a day or two. You will have tests in the months after the procedure to see how well the treatment worked. Follow-up care is a key part of your treatment and safety. Be sure to make and go to all appointments, and call your doctor if you are having problems. It's also a good idea to know your test results and keep a list of the medicines you take. Where can you learn more? Go to http://www.gray.com/  Enter O142 in the search box to learn more about \"Learning About Tumor Embolization and Ablation. \"  Current as of: September 8, 2021               Content Version: 13.2  © 2006-2022 [employer]. Care instructions adapted under license by [employer] (which disclaims liability or warranty for this information). If you have questions about a medical condition or this instruction, always ask your healthcare professional. [place] [address] any warranty or liability for your use of this information.          DISCHARGE SUMMARY from Nurse    PATIENT INSTRUCTIONS:    After general anesthesia or intravenous sedation, for 24 hours or while taking prescription Narcotics:  · Limit your activities  · Do not drive and operate hazardous machinery  · Do not make important personal or business decisions  · Do  not drink alcoholic beverages  · If you have not urinated within 8 hours after discharge, please contact your surgeon on call. Report the following to your surgeon:  · Excessive pain, swelling, redness or odor of or around the surgical area  · Temperature over 100.5  · Nausea and vomiting lasting longer than 4 hours or if unable to take medications  · Any signs of decreased circulation or nerve impairment to extremity: change in color, persistent  numbness, tingling, coldness or increase pain  · Any questions    What to do at Home:  Recommended activity: Activity as tolerated and no driving for today,           *  Please give a list of your current medications to your Primary Care Provider. *  Please update this list whenever your medications are discontinued, doses are      changed, or new medications (including over-the-counter products) are added. *  Please carry medication information at all times in case of emergency situations. These are general instructions for a healthy lifestyle:    No smoking/ No tobacco products/ Avoid exposure to second hand smoke  Surgeon General's Warning:  Quitting smoking now greatly reduces serious risk to your health. Obesity, smoking, and sedentary lifestyle greatly increases your risk for illness    A healthy diet, regular physical exercise & weight monitoring are important for maintaining a healthy lifestyle    You may be retaining fluid if you have a history of heart failure or if you experience any of the following symptoms:  Weight gain of 3 pounds or more overnight or 5 pounds in a week, increased swelling in our hands or feet or shortness of breath while lying flat in bed. Please call your doctor as soon as you notice any of these symptoms; do not wait until your next office visit. The discharge information has been reviewed with the patient and spouse. The patient and spouse verbalized understanding.   Discharge medications reviewed with the patient and spouse and appropriate educational materials and side effects teaching were provided.   Patient armband removed and shredded    ___________________________________________________________________________________________________________________________________

## 2022-07-01 NOTE — PROGRESS NOTES
Patient takes no anticoagulants, and has had no prior issues with sedation medications. Education regarding procedure and sedation provided; pt states understanding. Consent confirmed. Patient resting comfortably. Will continue to monitor.   Belvie Goldberg, RN